# Patient Record
Sex: FEMALE | URBAN - METROPOLITAN AREA
[De-identification: names, ages, dates, MRNs, and addresses within clinical notes are randomized per-mention and may not be internally consistent; named-entity substitution may affect disease eponyms.]

---

## 2021-01-08 ENCOUNTER — TELEPHONE ENCOUNTER (OUTPATIENT)
Dept: URBAN - METROPOLITAN AREA CLINIC 113 | Facility: CLINIC | Age: 51
End: 2021-01-08

## 2021-01-12 ENCOUNTER — LAB OUTSIDE AN ENCOUNTER (OUTPATIENT)
Dept: URBAN - METROPOLITAN AREA CLINIC 113 | Facility: CLINIC | Age: 51
End: 2021-01-12

## 2021-01-12 ENCOUNTER — OFFICE VISIT (OUTPATIENT)
Dept: URBAN - METROPOLITAN AREA CLINIC 113 | Facility: CLINIC | Age: 51
End: 2021-01-12
Payer: COMMERCIAL

## 2021-01-12 ENCOUNTER — TELEPHONE ENCOUNTER (OUTPATIENT)
Dept: URBAN - METROPOLITAN AREA CLINIC 113 | Facility: CLINIC | Age: 51
End: 2021-01-12

## 2021-01-12 ENCOUNTER — WEB ENCOUNTER (OUTPATIENT)
Dept: URBAN - METROPOLITAN AREA CLINIC 113 | Facility: CLINIC | Age: 51
End: 2021-01-12

## 2021-01-12 VITALS
HEART RATE: 78 BPM | SYSTOLIC BLOOD PRESSURE: 114 MMHG | DIASTOLIC BLOOD PRESSURE: 71 MMHG | HEIGHT: 64 IN | WEIGHT: 141 LBS | BODY MASS INDEX: 24.07 KG/M2 | TEMPERATURE: 98.9 F

## 2021-01-12 DIAGNOSIS — C80.1 ADENOCARCINOMA OF UNKNOWN ORIGIN: ICD-10-CM

## 2021-01-12 PROCEDURE — G8427 DOCREV CUR MEDS BY ELIG CLIN: HCPCS | Performed by: INTERNAL MEDICINE

## 2021-01-12 PROCEDURE — 99203 OFFICE O/P NEW LOW 30 MIN: CPT | Performed by: INTERNAL MEDICINE

## 2021-01-12 RX ORDER — DOCUSATE SODIUM 100 MG/1
1 CAPSULE AS NEEDED CAPSULE, LIQUID FILLED ORAL
Status: ACTIVE | COMMUNITY

## 2021-01-12 RX ORDER — IBUPROFEN 800 MG/1
1 TABLET WITH FOOD OR MILK AS NEEDED TABLET ORAL
Status: ACTIVE | COMMUNITY

## 2021-01-12 NOTE — HPI-TODAY'S VISIT:
Patient is a very delightful 50-year-old female who states that she developed some right lower quadrant pain in March 2020 underwent ultrasound and was told she had a mass on her left ovary.  She followed up with gynecologic oncology and they felt this was benign.  She was doing well and recently underwent hysterectomy for problems of leiomyomas.  She was incidentally found to have malignancy present in both ovaries and uterus according to the patient though I do not have that pathology available at this time.  He was felt to be of gastrointestinal origin based upon pathologic specimens.  She is never had upper or lower endoscopy.  She states she had a CT scan after this but she does not know the results but was told verbally that it was unremarkable.  There is no family history of colon cancer or gastric cancer.  She has no localizing gastrointestinal symptoms.

## 2021-01-12 NOTE — EXAM-PHYSICAL EXAM
She is alert and oriented to person place and situation no acute distress.  Neck is supple without adenopathy or thyromegaly chest is clear to auscultation heart reveals a regular rate and rhythm without capsules or murmurs abdomen is soft mildly distended in the lower abdomen with diffuse mild tenderness.  Her wound incision sites are healing without erythema or exudate.  Extremities without clubbing cyanosis or edema

## 2021-01-19 ENCOUNTER — OFFICE VISIT (OUTPATIENT)
Dept: URBAN - METROPOLITAN AREA CLINIC 113 | Facility: CLINIC | Age: 51
End: 2021-01-19

## 2021-01-20 ENCOUNTER — OFFICE VISIT (OUTPATIENT)
Dept: URBAN - METROPOLITAN AREA MEDICAL CENTER 43 | Facility: MEDICAL CENTER | Age: 51
End: 2021-01-20
Payer: COMMERCIAL

## 2021-01-20 DIAGNOSIS — C80.1 ACINAR CELL CARCINOMA: ICD-10-CM

## 2021-01-20 DIAGNOSIS — K44.9 DIAPHRAGMATIC HERNIA: ICD-10-CM

## 2021-01-20 DIAGNOSIS — Z12.11 COLON CANCER SCREENING: ICD-10-CM

## 2021-01-20 DIAGNOSIS — C77.2 SECONDARY AND UNSPECIFIED MALIGNANT NEOPLASM OF INTRA-ABDOMINAL LYMPH NODES: ICD-10-CM

## 2021-01-20 PROCEDURE — 43235 EGD DIAGNOSTIC BRUSH WASH: CPT | Performed by: INTERNAL MEDICINE

## 2021-01-20 PROCEDURE — G0121 COLON CA SCRN NOT HI RSK IND: HCPCS | Performed by: INTERNAL MEDICINE

## 2021-01-20 RX ORDER — DOCUSATE SODIUM 100 MG/1
1 CAPSULE AS NEEDED CAPSULE, LIQUID FILLED ORAL
Status: ACTIVE | COMMUNITY

## 2021-01-20 RX ORDER — IBUPROFEN 800 MG/1
1 TABLET WITH FOOD OR MILK AS NEEDED TABLET ORAL
Status: ACTIVE | COMMUNITY

## 2021-03-26 ENCOUNTER — OFFICE VISIT (OUTPATIENT)
Dept: URBAN - METROPOLITAN AREA CLINIC 113 | Facility: CLINIC | Age: 51
End: 2021-03-26

## 2021-06-11 ENCOUNTER — WEB ENCOUNTER (OUTPATIENT)
Dept: URBAN - METROPOLITAN AREA CLINIC 113 | Facility: CLINIC | Age: 51
End: 2021-06-11

## 2021-06-11 ENCOUNTER — OFFICE VISIT (OUTPATIENT)
Dept: URBAN - METROPOLITAN AREA CLINIC 113 | Facility: CLINIC | Age: 51
End: 2021-06-11
Payer: COMMERCIAL

## 2021-06-11 VITALS
BODY MASS INDEX: 21.51 KG/M2 | SYSTOLIC BLOOD PRESSURE: 95 MMHG | HEART RATE: 73 BPM | WEIGHT: 126 LBS | RESPIRATION RATE: 18 BRPM | TEMPERATURE: 98.3 F | DIASTOLIC BLOOD PRESSURE: 62 MMHG | HEIGHT: 64 IN

## 2021-06-11 DIAGNOSIS — C80.1 ADENOCARCINOMA OF UNKNOWN ORIGIN: ICD-10-CM

## 2021-06-11 PROCEDURE — 99213 OFFICE O/P EST LOW 20 MIN: CPT | Performed by: INTERNAL MEDICINE

## 2021-06-11 RX ORDER — LORAZEPAM 0.5 MG/1
1 TABLET AT BEDTIME AS NEEDED TABLET ORAL ONCE A DAY
Status: ACTIVE | COMMUNITY

## 2021-06-11 RX ORDER — IBUPROFEN 800 MG/1
1 TABLET WITH FOOD OR MILK AS NEEDED TABLET ORAL
Status: DISCONTINUED | COMMUNITY

## 2021-06-11 RX ORDER — DOCUSATE SODIUM 100 MG/1
1 CAPSULE AS NEEDED CAPSULE, LIQUID FILLED ORAL
Status: ACTIVE | COMMUNITY

## 2021-06-11 NOTE — HPI-TODAY'S VISIT:
Patient is a very delightful 50-year-old female who states that she developed some right lower quadrant pain in March 2020 underwent ultrasound and was told she had a mass on her left ovary.  She followed up with gynecologic oncology and they felt this was benign.  She was doing well and recently underwent hysterectomy for problems of leiomyomas.  She was incidentally found to have malignancy present in both ovaries and uterus according to the patient though I do not have that pathology available at this time.  He was felt to be of gastrointestinal origin based upon pathologic specimens.  She is never had upper or lower endoscopy.  She states she had a CT scan after this but she does not know the results but was told verbally that it was unremarkable.  There is no family history of colon cancer or gastric cancer.  She has no localizing gastrointestinal symptoms. Upper endoscopy revealed a small hiatal hernia.  Colonoscopy was unremarkable. The patient states since I last saw her she went to [facility] and underwent chemotherapy and then diagnostic laparotomy.  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  They have not found any other recurrence of disease.  They plan on doing eventually a right hemicolectomy along with what sounds like somewhat of a pelvic exoneration and splenectomy along with intra-abdominal chemotherapy.  She has numerous questions in regards to constipation which is actually under good control with her new diet.  She is on the Mediterranean diet.  She has questions about acid reflux.  I answered all questions.

## 2022-11-30 ENCOUNTER — OFFICE VISIT (OUTPATIENT)
Dept: URBAN - METROPOLITAN AREA CLINIC 113 | Facility: CLINIC | Age: 52
End: 2022-11-30
Payer: COMMERCIAL

## 2022-11-30 VITALS
BODY MASS INDEX: 22.71 KG/M2 | HEART RATE: 67 BPM | HEIGHT: 64 IN | TEMPERATURE: 97.7 F | RESPIRATION RATE: 16 BRPM | SYSTOLIC BLOOD PRESSURE: 100 MMHG | DIASTOLIC BLOOD PRESSURE: 62 MMHG | WEIGHT: 133 LBS

## 2022-11-30 DIAGNOSIS — R10.84 GENERALIZED ABDOMINAL PAIN: ICD-10-CM

## 2022-11-30 DIAGNOSIS — K59.01 CONSTIPATION BY DELAYED COLONIC TRANSIT: ICD-10-CM

## 2022-11-30 DIAGNOSIS — C80.1 ADENOCARCINOMA OF UNKNOWN ORIGIN: ICD-10-CM

## 2022-11-30 DIAGNOSIS — K21.9 GASTROESOPHAGEAL REFLUX DISEASE, UNSPECIFIED WHETHER ESOPHAGITIS PRESENT: ICD-10-CM

## 2022-11-30 PROBLEM — 35298007: Status: ACTIVE | Noted: 2022-11-30

## 2022-11-30 PROBLEM — 235595009: Status: ACTIVE | Noted: 2022-11-30

## 2022-11-30 PROBLEM — 255052006: Status: ACTIVE | Noted: 2021-01-12

## 2022-11-30 PROCEDURE — 99214 OFFICE O/P EST MOD 30 MIN: CPT | Performed by: INTERNAL MEDICINE

## 2022-11-30 RX ORDER — DOCUSATE SODIUM 100 MG/1
1 CAPSULE AS NEEDED CAPSULE, LIQUID FILLED ORAL
Status: ON HOLD | COMMUNITY

## 2022-11-30 RX ORDER — HYOSCYAMINE SULFATE 0.12 MG/1
1 TABLET UNDER THE TONGUE AND ALLOW TO DISSOLVE  AS NEEDED TABLET, ORALLY DISINTEGRATING ORAL
Qty: 90 | Refills: 3 | OUTPATIENT
Start: 2022-11-30 | End: 2023-11-25

## 2022-11-30 RX ORDER — RABEPRAZOLE SODIUM 20 MG/1
TAKE 1 TABLET DAILY TABLET, DELAYED RELEASE ORAL ONCE A DAY
Qty: 90 | Refills: 3 | OUTPATIENT
Start: 2022-11-30

## 2022-11-30 RX ORDER — LORAZEPAM 0.5 MG/1
1 TABLET AT BEDTIME AS NEEDED TABLET ORAL ONCE A DAY
Status: ACTIVE | COMMUNITY

## 2022-11-30 NOTE — EXAM-PHYSICAL EXAM
She is alert and oriented to person place and situation no acute distress.  Abdomen is soft nondistended with no masses in the right lower quadrant.  No hernia present.

## 2022-11-30 NOTE — HPI-TODAY'S VISIT:
Patient is a very delightful 50-year-old female who states that she developed some right lower quadrant pain in March 2020 underwent ultrasound and was told she had a mass on her left ovary.  She followed up with gynecologic oncology and they felt this was benign.  She was doing well and recently underwent hysterectomy for problems of leiomyomas.  She was incidentally found to have malignancy present in both ovaries and uterus according to the patient though I do not have that pathology available at this time.  He was felt to be of gastrointestinal origin based upon pathologic specimens.  She is never had upper or lower endoscopy.  She states she had a CT scan after this but she does not know the results but was told verbally that it was unremarkable.  There is no family history of colon cancer or gastric cancer.  She has no localizing gastrointestinal symptoms. Upper endoscopy revealed a small hiatal hernia.  Colonoscopy was unremarkable. The patient states since I last saw her she went to [facility] and underwent chemotherapy and then diagnostic laparotomy.  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  They have not found any other recurrence of disease.  They plan on doing eventually a right hemicolectomy along with what sounds like somewhat of a pelvic exoneration and splenectomy along with intra-abdominal chemotherapy.  She has numerous questions in regards to constipation which is actually under good control with her new diet.  She is on the Mediterranean diet.  She has questions about acid reflux.  I answered all questions. Interval history, 11/30/2022.  The patient states that she has developed over the last year since her right hemicolectomy heartburn and regurgitation.  In addition she does have occasional severe crampy abdominal pain.  Bowel movements tend to be on the constipated side.  Bowel movements are often log like.  She also persists with a mild right lower quadrant discomfort.  She states that surgery all her nodes were negative.

## 2023-03-24 ENCOUNTER — OFFICE VISIT (OUTPATIENT)
Dept: URBAN - METROPOLITAN AREA CLINIC 113 | Facility: CLINIC | Age: 53
End: 2023-03-24
Payer: COMMERCIAL

## 2023-03-24 VITALS
HEIGHT: 64 IN | WEIGHT: 134 LBS | DIASTOLIC BLOOD PRESSURE: 71 MMHG | HEART RATE: 63 BPM | RESPIRATION RATE: 18 BRPM | SYSTOLIC BLOOD PRESSURE: 109 MMHG | BODY MASS INDEX: 22.88 KG/M2 | TEMPERATURE: 97.5 F

## 2023-03-24 DIAGNOSIS — C80.1 ADENOCARCINOMA OF UNKNOWN ORIGIN: ICD-10-CM

## 2023-03-24 DIAGNOSIS — K21.9 GASTROESOPHAGEAL REFLUX DISEASE, UNSPECIFIED WHETHER ESOPHAGITIS PRESENT: ICD-10-CM

## 2023-03-24 DIAGNOSIS — R10.84 GENERALIZED ABDOMINAL PAIN: ICD-10-CM

## 2023-03-24 DIAGNOSIS — K59.01 CONSTIPATION BY DELAYED COLONIC TRANSIT: ICD-10-CM

## 2023-03-24 PROCEDURE — 99214 OFFICE O/P EST MOD 30 MIN: CPT | Performed by: INTERNAL MEDICINE

## 2023-03-24 RX ORDER — RABEPRAZOLE SODIUM 20 MG/1
TAKE 1 TABLET DAILY TABLET, DELAYED RELEASE ORAL ONCE A DAY
Qty: 90 | Refills: 3 | Status: ACTIVE | COMMUNITY
Start: 2022-11-30

## 2023-03-24 RX ORDER — DOCUSATE SODIUM 100 MG/1
1 CAPSULE AS NEEDED CAPSULE, LIQUID FILLED ORAL
Status: ON HOLD | COMMUNITY

## 2023-03-24 RX ORDER — HYOSCYAMINE SULFATE 0.12 MG/1
1 TABLET UNDER THE TONGUE AND ALLOW TO DISSOLVE  AS NEEDED TABLET, ORALLY DISINTEGRATING ORAL
Qty: 90 | Refills: 3 | Status: ON HOLD | COMMUNITY
Start: 2022-11-30 | End: 2023-11-25

## 2023-03-24 RX ORDER — LORAZEPAM 0.5 MG/1
1 TABLET AT BEDTIME AS NEEDED TABLET ORAL ONCE A DAY
Status: ACTIVE | COMMUNITY

## 2023-03-24 NOTE — HPI-TODAY'S VISIT:
Patient is a very delightful 50-year-old female who states that she developed some right lower quadrant pain in March 2020 underwent ultrasound and was told she had a mass on her left ovary.  She followed up with gynecologic oncology and they felt this was benign.  She was doing well and recently underwent hysterectomy for problems of leiomyomas.  She was incidentally found to have malignancy present in both ovaries and uterus according to the patient though I do not have that pathology available at this time.  He was felt to be of gastrointestinal origin based upon pathologic specimens.  She is never had upper or lower endoscopy.  She states she had a CT scan after this but she does not know the results but was told verbally that it was unremarkable.  There is no family history of colon cancer or gastric cancer.  She has no localizing gastrointestinal symptoms. Upper endoscopy revealed a small hiatal hernia.  Colonoscopy was unremarkable. The patient states since I last saw her she went to [facility] and underwent chemotherapy and then diagnostic laparotomy.  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  They have not found any other recurrence of disease.  They plan on doing eventually a right hemicolectomy along with what sounds like somewhat of a pelvic exoneration and splenectomy along with intra-abdominal chemotherapy.  She has numerous questions in regards to constipation which is actually under good control with her new diet.  She is on the Mediterranean diet.  She has questions about acid reflux.  I answered all questions. Interval history, 11/30/2022.  The patient states that she has developed over the last year since her right hemicolectomy heartburn and regurgitation.  In addition she does have occasional severe crampy abdominal pain.  Bowel movements tend to be on the constipated side.  Bowel movements are often log like.  She also persists with a mild right lower quadrant discomfort.  She states that surgery all her nodes were negative. Interval history, 3/24/2023.  The patient states that fiber actually made her symptoms worse.  Rabeprazole has done a fantastic job with her heartburn.  She has not use the sublingual Levsin as of yet.

## 2023-05-30 ENCOUNTER — TELEPHONE ENCOUNTER (OUTPATIENT)
Dept: URBAN - METROPOLITAN AREA CLINIC 113 | Facility: CLINIC | Age: 53
End: 2023-05-30

## 2023-09-08 ENCOUNTER — TELEPHONE ENCOUNTER (OUTPATIENT)
Dept: URBAN - METROPOLITAN AREA CLINIC 113 | Facility: CLINIC | Age: 53
End: 2023-09-08

## 2023-10-10 ENCOUNTER — ERX REFILL RESPONSE (OUTPATIENT)
Dept: URBAN - METROPOLITAN AREA CLINIC 113 | Facility: CLINIC | Age: 53
End: 2023-10-10

## 2023-10-10 RX ORDER — RABEPRAZOLE SODIUM 20 MG/1
TAKE 1 TABLET DAILY TABLET, DELAYED RELEASE ORAL ONCE A DAY
Qty: 90 | Refills: 3 | OUTPATIENT

## 2023-10-10 RX ORDER — RABEPRAZOLE SODIUM 20 MG/1
TAKE ONE TABLET BY MOUTH ONCE A DAY. THANK YOU TABLET, DELAYED RELEASE ORAL
Qty: 90 TABLET | Refills: 3 | OUTPATIENT

## 2024-01-11 ENCOUNTER — TELEPHONE ENCOUNTER (OUTPATIENT)
Dept: URBAN - METROPOLITAN AREA CLINIC 113 | Facility: CLINIC | Age: 54
End: 2024-01-11

## 2024-01-19 ENCOUNTER — LAB OUTSIDE AN ENCOUNTER (OUTPATIENT)
Dept: URBAN - METROPOLITAN AREA CLINIC 107 | Facility: CLINIC | Age: 54
End: 2024-01-19

## 2024-01-19 ENCOUNTER — OFFICE VISIT (OUTPATIENT)
Dept: URBAN - METROPOLITAN AREA CLINIC 107 | Facility: CLINIC | Age: 54
End: 2024-01-19
Payer: COMMERCIAL

## 2024-01-19 VITALS
BODY MASS INDEX: 24.75 KG/M2 | DIASTOLIC BLOOD PRESSURE: 63 MMHG | TEMPERATURE: 98.2 F | HEIGHT: 64 IN | HEART RATE: 75 BPM | SYSTOLIC BLOOD PRESSURE: 108 MMHG | WEIGHT: 145 LBS

## 2024-01-19 DIAGNOSIS — R10.84 GENERALIZED ABDOMINAL PAIN: ICD-10-CM

## 2024-01-19 DIAGNOSIS — C80.1 ADENOCARCINOMA OF UNKNOWN ORIGIN: ICD-10-CM

## 2024-01-19 DIAGNOSIS — R97.0 ELEVATED CEA: ICD-10-CM

## 2024-01-19 DIAGNOSIS — K21.9 GASTROESOPHAGEAL REFLUX DISEASE, UNSPECIFIED WHETHER ESOPHAGITIS PRESENT: ICD-10-CM

## 2024-01-19 DIAGNOSIS — K59.01 CONSTIPATION BY DELAYED COLONIC TRANSIT: ICD-10-CM

## 2024-01-19 DIAGNOSIS — R10.13 EPIGASTRIC PAIN: ICD-10-CM

## 2024-01-19 PROBLEM — 445201008: Status: ACTIVE | Noted: 2024-01-19

## 2024-01-19 PROCEDURE — 99214 OFFICE O/P EST MOD 30 MIN: CPT

## 2024-01-19 RX ORDER — SODIUM, POTASSIUM,MAG SULFATES 17.5-3.13G
354 ML SOLUTION, RECONSTITUTED, ORAL ORAL ONCE
Qty: 354 MILLILITER | Refills: 0 | OUTPATIENT
Start: 2024-01-19 | End: 2024-01-20

## 2024-01-19 RX ORDER — TRAZODONE HYDROCHLORIDE 50 MG/1
1 TABLET AT BEDTIME AS NEEDED TABLET ORAL ONCE A DAY
Status: ACTIVE | COMMUNITY

## 2024-01-19 RX ORDER — LORAZEPAM 0.5 MG/1
1 TABLET AT BEDTIME AS NEEDED TABLET ORAL ONCE A DAY
Status: ACTIVE | COMMUNITY

## 2024-01-19 RX ORDER — RABEPRAZOLE SODIUM 20 MG/1
TAKE ONE TABLET BY MOUTH ONCE A DAY. THANK YOU TABLET, DELAYED RELEASE ORAL
Qty: 90 TABLET | Refills: 3 | Status: ACTIVE | COMMUNITY

## 2024-01-19 RX ORDER — DOCUSATE SODIUM 100 MG/1
1 CAPSULE AS NEEDED CAPSULE, LIQUID FILLED ORAL
Status: ON HOLD | COMMUNITY

## 2024-01-19 NOTE — HPI-TODAY'S VISIT:
Patient is a very delightful 50-year-old female who states that she developed some right lower quadrant pain in March 2020 underwent ultrasound and was told she had a mass on her left ovary.  She followed up with gynecologic oncology, and they felt this was benign.  She was doing well and recently underwent hysterectomy for problems of leiomyomas.  She was incidentally found to have malignancy present in both ovaries and uterus according to the patient though I do not have that pathology available at this time.  He was felt to be of gastrointestinal origin based upon pathologic specimens.  She is never had upper or lower endoscopy.  She states she had a CT scan after this, but she does not know the results but was told verbally that it was unremarkable.  There is no family history of colon cancer or gastric cancer.  She has no localizing gastrointestinal symptoms.  Upper endoscopy revealed a small hiatal hernia.  Colonoscopy was unremarkable. The patient states since I last saw her she went to [facility] and underwent chemotherapy and then diagnostic laparotomy.  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  They have not found any other recurrence of disease.  They plan on doing eventually a right hemicolectomy along with what sounds like somewhat of a pelvic exoneration and splenectomy along with intra-abdominal chemotherapy.  She has numerous questions in regards to constipation which is actually under good control with her new diet.  She is on the Mediterranean diet.  She has questions about acid reflux.  I answered all questions.  Interval history, 11/30/2022.   The patient states that she has developed over the last year since her right hemicolectomy heartburn and regurgitation.  In addition she does have occasional severe crampy abdominal pain.  Bowel movements tend to be on the constipated side.  Bowel movements are often log like.  She also persists with a mild right lower quadrant discomfort.  She states that surgery all her nodes were negative.  Interval history, 3/24/2023.   The patient states that fiber actually made her symptoms worse.  Rabeprazole has done a fantastic job with her heartburn.  She has not use the sublingual Levsin as of yet.  Interval history, 1/19/2024: 53-year-old female presents for long interval follow-up.  She was last seen on 3/20/2023.  She was not deemed a good candidate for acid reflux surgery due to her history of pelvic exoneration and right hemicolectomy.  She was to minimize use of rabeprazole for good control.  Her change in bowels were likely secondary to her surgery and component of irritable bowel syndrome.  Fiber worsened her bowels.  She was to continue Levsin as needed.  She has a history of metastatic adenocarcinoma of the appendix.  We would treat this as colon cancer and repeat colonoscopy in January 2024.  She requested a pelvic floor therapist.  She was informed of Maddie Ashby through core therapy and Susie Oviedo at [facility] pelvic floor therapy.  Contacted the office on 1/11 requesting an EGD due to elevated CEA levels and abdominal discomfort per her oncologist.  She would require an office visit.  She has had increased belching and flatulence of late, more so the belching. This is worsened at night. She will have a burning or tearing pain in her epigastrium. This is not affected by food. She does still have her gallbladder. Sometimes her entire abdomen feels sore as if she has been doing sit ups. Twisting makes the pain worse. This has somewhat subsided. She has cut out caffeine, ETOH, spicy foods, seafood, etc. She typically drinks 1-2 glasses of wine per night. She denies nausea or vomiting. Reflux remains well controlled on Rabeprazole every other day. Denies excessive NSAID use. Weight has increased. No unintentional weight loss. NO fevers or chills. She does become constipated after CT scans. She still does not feel she can completely evacuate. She has a BM every other day. She is not currently on anything. She has a GI at [facility] as well who recommended MiraLAX.  MiraLAX made her feel gassy. She also had an anal manometry which was "essentially normal with mild increase in resting anal sphincter tone" and passed the balloon expulsion test. However, he still recommended pelvic floor therapy. She has yet to see pelvic therapy due to inability to travel with her schedule.  CT chest/abdomen/pelvis ordered by Dr. Kassidy Willis (oncologist) on 1/10/2024: Tiny enhancing focus in the right lobe of the liver, possibly flash filled hemangioma. No recurrent or metastatic disease in the chest, abdomen or pelvis. Stable groundglass right lower lobe pulmonary nodule since 1/08/2021. Labs 1/10/2024:Unremarkable CBC, normal CMP, normal , elevated CEA of 8.4.

## 2024-01-23 ENCOUNTER — CLAIMS CREATED FROM THE CLAIM WINDOW (OUTPATIENT)
Dept: URBAN - METROPOLITAN AREA CLINIC 4 | Facility: CLINIC | Age: 54
End: 2024-01-23
Payer: COMMERCIAL

## 2024-01-23 ENCOUNTER — CLAIMS CREATED FROM THE CLAIM WINDOW (OUTPATIENT)
Dept: URBAN - METROPOLITAN AREA SURGERY CENTER 25 | Facility: SURGERY CENTER | Age: 54
End: 2024-01-23
Payer: COMMERCIAL

## 2024-01-23 DIAGNOSIS — R07.9 CHEST PAIN: ICD-10-CM

## 2024-01-23 DIAGNOSIS — K22.89 DILATATION OF ESOPHAGUS: ICD-10-CM

## 2024-01-23 DIAGNOSIS — R10.13 EPIGASTRIC PAIN: ICD-10-CM

## 2024-01-23 DIAGNOSIS — R10.13 ABDOMINAL DISCOMFORT, EPIGASTRIC: ICD-10-CM

## 2024-01-23 DIAGNOSIS — K22.89 OTHER SPECIFIED DISEASE OF ESOPHAGUS: ICD-10-CM

## 2024-01-23 DIAGNOSIS — R07.9 ACUTE CHEST PAIN: ICD-10-CM

## 2024-01-23 PROCEDURE — 00731 ANES UPR GI NDSC PX NOS: CPT | Performed by: ANESTHESIOLOGY

## 2024-01-23 PROCEDURE — 00731 ANES UPR GI NDSC PX NOS: CPT | Performed by: ANESTHESIOLOGIST ASSISTANT

## 2024-01-23 PROCEDURE — 43239 EGD BIOPSY SINGLE/MULTIPLE: CPT | Performed by: INTERNAL MEDICINE

## 2024-01-23 PROCEDURE — 88305 TISSUE EXAM BY PATHOLOGIST: CPT | Performed by: PATHOLOGY

## 2024-01-23 PROCEDURE — G8907 PT DOC NO EVENTS ON DISCHARG: HCPCS | Performed by: INTERNAL MEDICINE

## 2024-01-23 RX ORDER — RABEPRAZOLE SODIUM 20 MG/1
TAKE ONE TABLET BY MOUTH ONCE A DAY. THANK YOU TABLET, DELAYED RELEASE ORAL
Qty: 90 TABLET | Refills: 3 | Status: ACTIVE | COMMUNITY

## 2024-01-23 RX ORDER — LORAZEPAM 0.5 MG/1
1 TABLET AT BEDTIME AS NEEDED TABLET ORAL ONCE A DAY
Status: ACTIVE | COMMUNITY

## 2024-01-23 RX ORDER — TRAZODONE HYDROCHLORIDE 50 MG/1
1 TABLET AT BEDTIME AS NEEDED TABLET ORAL ONCE A DAY
Status: ACTIVE | COMMUNITY

## 2024-01-23 RX ORDER — DOCUSATE SODIUM 100 MG/1
1 CAPSULE AS NEEDED CAPSULE, LIQUID FILLED ORAL
Status: ON HOLD | COMMUNITY

## 2024-02-13 ENCOUNTER — COLON (OUTPATIENT)
Dept: URBAN - METROPOLITAN AREA SURGERY CENTER 25 | Facility: SURGERY CENTER | Age: 54
End: 2024-02-13
Payer: COMMERCIAL

## 2024-02-13 ENCOUNTER — LAB (OUTPATIENT)
Dept: URBAN - METROPOLITAN AREA CLINIC 4 | Facility: CLINIC | Age: 54
End: 2024-02-13
Payer: COMMERCIAL

## 2024-02-13 DIAGNOSIS — Z98.0 INTESTINAL BYPASS AND ANASTOMOSIS STATUS: ICD-10-CM

## 2024-02-13 DIAGNOSIS — K63.89 OTHER SPECIFIED DISEASES OF INTESTINE: ICD-10-CM

## 2024-02-13 DIAGNOSIS — K51.40 INFLAMMATORY POLYPS OF COLON WITHOUT COMPLICATIONS: ICD-10-CM

## 2024-02-13 DIAGNOSIS — Z85.038 PERSONAL HISTORY OF OTHER MALIGNANT NEOPLASM OF LARGE INTESTINE: ICD-10-CM

## 2024-02-13 PROCEDURE — 45380 COLONOSCOPY AND BIOPSY: CPT | Performed by: INTERNAL MEDICINE

## 2024-02-13 PROCEDURE — 88341 IMHCHEM/IMCYTCHM EA ADD ANTB: CPT | Performed by: PATHOLOGY

## 2024-02-13 PROCEDURE — 88342 IMHCHEM/IMCYTCHM 1ST ANTB: CPT | Performed by: PATHOLOGY

## 2024-02-13 PROCEDURE — 88305 TISSUE EXAM BY PATHOLOGIST: CPT | Performed by: PATHOLOGY

## 2024-02-13 RX ORDER — RABEPRAZOLE SODIUM 20 MG/1
TAKE ONE TABLET BY MOUTH ONCE A DAY. THANK YOU TABLET, DELAYED RELEASE ORAL
Qty: 90 TABLET | Refills: 3 | Status: ACTIVE | COMMUNITY

## 2024-02-13 RX ORDER — DOCUSATE SODIUM 100 MG/1
1 CAPSULE AS NEEDED CAPSULE, LIQUID FILLED ORAL
Status: ON HOLD | COMMUNITY

## 2024-02-13 RX ORDER — LORAZEPAM 0.5 MG/1
1 TABLET AT BEDTIME AS NEEDED TABLET ORAL ONCE A DAY
Status: ACTIVE | COMMUNITY

## 2024-02-13 RX ORDER — TRAZODONE HYDROCHLORIDE 50 MG/1
1 TABLET AT BEDTIME AS NEEDED TABLET ORAL ONCE A DAY
Status: ACTIVE | COMMUNITY

## 2024-03-20 ENCOUNTER — OV EP (OUTPATIENT)
Dept: URBAN - METROPOLITAN AREA CLINIC 107 | Facility: CLINIC | Age: 54
End: 2024-03-20
Payer: COMMERCIAL

## 2024-03-20 VITALS
WEIGHT: 140 LBS | HEIGHT: 64 IN | BODY MASS INDEX: 23.9 KG/M2 | HEART RATE: 75 BPM | TEMPERATURE: 98.1 F | SYSTOLIC BLOOD PRESSURE: 144 MMHG | DIASTOLIC BLOOD PRESSURE: 92 MMHG

## 2024-03-20 DIAGNOSIS — R97.0 ELEVATED CEA: ICD-10-CM

## 2024-03-20 DIAGNOSIS — C80.1 ADENOCARCINOMA OF UNKNOWN ORIGIN: ICD-10-CM

## 2024-03-20 DIAGNOSIS — K59.01 CONSTIPATION BY DELAYED COLONIC TRANSIT: ICD-10-CM

## 2024-03-20 DIAGNOSIS — R10.84 GENERALIZED ABDOMINAL PAIN: ICD-10-CM

## 2024-03-20 DIAGNOSIS — K21.9 GASTROESOPHAGEAL REFLUX DISEASE, UNSPECIFIED WHETHER ESOPHAGITIS PRESENT: ICD-10-CM

## 2024-03-20 PROCEDURE — 99213 OFFICE O/P EST LOW 20 MIN: CPT | Performed by: INTERNAL MEDICINE

## 2024-03-20 RX ORDER — DOCUSATE SODIUM 100 MG/1
1 CAPSULE AS NEEDED CAPSULE, LIQUID FILLED ORAL
Status: ON HOLD | COMMUNITY

## 2024-03-20 RX ORDER — TRAZODONE HYDROCHLORIDE 50 MG/1
1 TABLET AT BEDTIME AS NEEDED TABLET ORAL ONCE A DAY
Status: ACTIVE | COMMUNITY

## 2024-03-20 RX ORDER — LORAZEPAM 0.5 MG/1
1 TABLET AT BEDTIME AS NEEDED TABLET ORAL ONCE A DAY
Status: ACTIVE | COMMUNITY

## 2024-03-20 RX ORDER — RABEPRAZOLE SODIUM 20 MG/1
TAKE ONE TABLET BY MOUTH ONCE A DAY. THANK YOU TABLET, DELAYED RELEASE ORAL
Qty: 90 TABLET | Refills: 3 | Status: ACTIVE | COMMUNITY

## 2024-03-20 NOTE — HPI-TODAY'S VISIT:
Patient is a very delightful 50-year-old female who states that she developed some right lower quadrant pain in March 2020 underwent ultrasound and was told she had a mass on her left ovary.  She followed up with gynecologic oncology, and they felt this was benign.  She was doing well and recently underwent hysterectomy for problems of leiomyomas.  She was incidentally found to have malignancy present in both ovaries and uterus according to the patient though I do not have that pathology available at this time.  He was felt to be of gastrointestinal origin based upon pathologic specimens.  She is never had upper or lower endoscopy.  She states she had a CT scan after this, but she does not know the results but was told verbally that it was unremarkable.  There is no family history of colon cancer or gastric cancer.  She has no localizing gastrointestinal symptoms.  Upper endoscopy revealed a small hiatal hernia.  Colonoscopy was unremarkable. The patient states since I last saw her she went to [facility] and underwent chemotherapy and then diagnostic laparotomy.  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  They have not found any other recurrence of disease.  They plan on doing eventually a right hemicolectomy along with what sounds like somewhat of a pelvic exoneration and splenectomy along with intra-abdominal chemotherapy.  She has numerous questions in regards to constipation which is actually under good control with her new diet.  She is on the Mediterranean diet.  She has questions about acid reflux.  I answered all questions.  Interval history, 11/30/2022.   The patient states that she has developed over the last year since her right hemicolectomy heartburn and regurgitation.  In addition she does have occasional severe crampy abdominal pain.  Bowel movements tend to be on the constipated side.  Bowel movements are often log like.  She also persists with a mild right lower quadrant discomfort.  She states that surgery all her nodes were negative.  Interval history, 3/24/2023.   The patient states that fiber actually made her symptoms worse.  Rabeprazole has done a fantastic job with her heartburn.  She has not use the sublingual Levsin as of yet.  Interval history, 1/19/2024: 53-year-old female presents for long interval follow-up.  She was last seen on 3/20/2023.  She was not deemed a good candidate for acid reflux surgery due to her history of pelvic exoneration and right hemicolectomy.  She was to minimize use of rabeprazole for good control.  Her change in bowels were likely secondary to her surgery and component of irritable bowel syndrome.  Fiber worsened her bowels.  She was to continue Levsin as needed.  She has a history of metastatic adenocarcinoma of the appendix.  We would treat this as colon cancer and repeat colonoscopy in January 2024.  She requested a pelvic floor therapist.  She was informed of Maddie Ashby through core therapy and Susie Oviedo at [facility] pelvic floor therapy.  Contacted the office on 1/11 requesting an EGD due to elevated CEA levels and abdominal discomfort per her oncologist.  She would require an office visit.  She has had increased belching and flatulence of late, more so the belching. This is worsened at night. She will have a burning or tearing pain in her epigastrium. This is not affected by food. She does still have her gallbladder. Sometimes her entire abdomen feels sore as if she has been doing sit ups. Twisting makes the pain worse. This has somewhat subsided. She has cut out caffeine, ETOH, spicy foods, seafood, etc. She typically drinks 1-2 glasses of wine per night. She denies nausea or vomiting. Reflux remains well controlled on Rabeprazole every other day. Denies excessive NSAID use. Weight has increased. No unintentional weight loss. NO fevers or chills. She does become constipated after CT scans. She still does not feel she can completely evacuate. She has a BM every other day. She is not currently on anything. She has a GI at [facility] as well who recommended MiraLAX.  MiraLAX made her feel gassy. She also had an anal manometry which was "essentially normal with mild increase in resting anal sphincter tone" and passed the balloon expulsion test. However, he still recommended pelvic floor therapy. She has yet to see pelvic therapy due to inability to travel with her schedule.  CT chest/abdomen/pelvis ordered by Dr. Kassidy Willis (oncologist) on 1/10/2024: Tiny enhancing focus in the right lobe of the liver, possibly flash filled hemangioma. No recurrent or metastatic disease in the chest, abdomen or pelvis. Stable groundglass right lower lobe pulmonary nodule since 1/08/2021. Labs 1/10/2024:Unremarkable CBC, normal CMP, normal , elevated CEA of 8.4. Interval history, 3/20/2024.  Upper endoscopy performed January 23 of this year revealed mild furrowing of the esophagus likely variant of normal.  Biopsies were unremarkable with no evidence for eosinophilic esophagitis.  Colonoscopy performed February 13 of last month due to a personal history of colon cancer revealed a polyp in the distal ileum and evidence for an ileocolonic anastomosis.  Recommended 5-year follow-up on colonoscopy.  Biopsy of the ileal polyp returned as granulation tissue. The patient states she continues to take a proton pump inhibitor daily with good results.  She has occasional belching and we discussed aerophagia.  She does occasionally have some transient right lower quadrant pain and alternating diarrhea and constipation but more on the constipation side.  As stated above fiber has made her worse.  She discontinued that.

## 2024-10-21 ENCOUNTER — ERX REFILL RESPONSE (OUTPATIENT)
Dept: URBAN - METROPOLITAN AREA CLINIC 113 | Facility: CLINIC | Age: 54
End: 2024-10-21

## 2024-10-21 RX ORDER — RABEPRAZOLE SODIUM 20 MG/1
TAKE ONE TABLET BY MOUTH ONCE A DAY. THANK YOU TABLET, DELAYED RELEASE ORAL
Qty: 90 TABLET | Refills: 3 | OUTPATIENT

## 2025-08-28 ENCOUNTER — ERX REFILL RESPONSE (OUTPATIENT)
Dept: URBAN - METROPOLITAN AREA CLINIC 113 | Facility: CLINIC | Age: 55
End: 2025-08-28

## 2025-08-28 RX ORDER — RABEPRAZOLE SODIUM 20 MG/1
TAKE ONE TABLET BY MOUTH ONCE A DAY. THANK YOU TABLET, DELAYED RELEASE ORAL
Qty: 90 TABLET | Refills: 3 | OUTPATIENT

## 2025-08-28 RX ORDER — RABEPRAZOLE SODIUM 20 MG/1
TAKE ONE TABLET BY MOUTH ONCE A DAY. THANK YOU. 9 TABLET, DELAYED RELEASE ORAL
Qty: 90 TABLET | Refills: 0 | OUTPATIENT